# Patient Record
Sex: MALE | Race: WHITE | NOT HISPANIC OR LATINO | Employment: UNEMPLOYED | URBAN - METROPOLITAN AREA
[De-identification: names, ages, dates, MRNs, and addresses within clinical notes are randomized per-mention and may not be internally consistent; named-entity substitution may affect disease eponyms.]

---

## 2020-05-11 LAB — HIV 1&2 EXT: NORMAL

## 2020-12-05 ENCOUNTER — TRANSFERRED RECORDS (OUTPATIENT)
Dept: HEALTH INFORMATION MANAGEMENT | Facility: CLINIC | Age: 50
End: 2020-12-05

## 2020-12-06 ENCOUNTER — TRANSFERRED RECORDS (OUTPATIENT)
Dept: HEALTH INFORMATION MANAGEMENT | Facility: CLINIC | Age: 50
End: 2020-12-06

## 2020-12-08 ENCOUNTER — TRANSFERRED RECORDS (OUTPATIENT)
Dept: HEALTH INFORMATION MANAGEMENT | Facility: CLINIC | Age: 50
End: 2020-12-08

## 2021-01-23 ENCOUNTER — TRANSFERRED RECORDS (OUTPATIENT)
Dept: HEALTH INFORMATION MANAGEMENT | Facility: CLINIC | Age: 51
End: 2021-01-23

## 2021-02-02 LAB
ALT SERPL-CCNC: 16 U/L (ref 0–55)
AST SERPL-CCNC: 33 U/L (ref 10–40)
GLUCOSE SERPL-MCNC: 96 MG/DL (ref 70–100)
INR PPP: 1.4 (ref 0.9–1.1)

## 2021-03-01 ENCOUNTER — TRANSFERRED RECORDS (OUTPATIENT)
Dept: HEALTH INFORMATION MANAGEMENT | Facility: CLINIC | Age: 51
End: 2021-03-01

## 2021-03-02 ENCOUNTER — TRANSFERRED RECORDS (OUTPATIENT)
Dept: HEALTH INFORMATION MANAGEMENT | Facility: CLINIC | Age: 51
End: 2021-03-02

## 2021-03-10 ENCOUNTER — TRANSFERRED RECORDS (OUTPATIENT)
Dept: HEALTH INFORMATION MANAGEMENT | Facility: CLINIC | Age: 51
End: 2021-03-10

## 2021-03-10 LAB
CREAT SERPL-MCNC: 1.2 MG/DL (ref 0.73–1.18)
GFR SERPL CREATININE-BSD FRML MDRD: >60 ML/MIN/1.73M2
POTASSIUM SERPL-SCNC: 4 MMOL/L (ref 3.5–5.1)

## 2021-03-16 ENCOUNTER — MEDICAL CORRESPONDENCE (OUTPATIENT)
Dept: HEALTH INFORMATION MANAGEMENT | Facility: CLINIC | Age: 51
End: 2021-03-16

## 2021-03-24 ENCOUNTER — REFERRAL (OUTPATIENT)
Dept: TRANSPLANT | Facility: CLINIC | Age: 51
End: 2021-03-24

## 2021-03-24 DIAGNOSIS — Z12.5 ENCOUNTER FOR SCREENING FOR MALIGNANT NEOPLASM OF PROSTATE: ICD-10-CM

## 2021-03-24 DIAGNOSIS — K70.31 ALCOHOLIC CIRRHOSIS OF LIVER WITH ASCITES (H): Primary | ICD-10-CM

## 2021-03-24 DIAGNOSIS — Z79.899 OTHER LONG TERM (CURRENT) DRUG THERAPY: ICD-10-CM

## 2021-03-24 DIAGNOSIS — K76.6 PORTAL HYPERTENSION (H): ICD-10-CM

## 2021-03-24 DIAGNOSIS — N18.31 CHRONIC KIDNEY DISEASE, STAGE 3A (H): ICD-10-CM

## 2021-03-24 DIAGNOSIS — I82.890: ICD-10-CM

## 2021-03-24 DIAGNOSIS — K70.30 ALCOHOLIC CIRRHOSIS (H): ICD-10-CM

## 2021-03-24 NOTE — LETTER
4/6/2021    Colby Kerr  [address] [place] [address]      Dear Colby,    Thank you for your interest in the Transplant Center at [facility]. We look forward to being a part of your care team and assisting you through the transplant process.    As we discussed, your transplant coordinator is Alfonso Garcia Jr. (Liver).  You may call your coordinator at any time with questions or concerns call [phone].    Please complete the following.    1. Fill out and return the enclosed forms    Authorization for Electronic Communication    Authorization to Discuss Protected Health Information    Authorization for Release of Protected Health Information    Authorization for Care Everywhere Release of Information    2. Sign up for:    [employer], access to your electronic medical record (see enclosed pamphlet)    [email or web address], a transplant education website    You can use these tools to learn more about your transplant, communicate with your care team, and track your medical details      Sincerely,  Solid Organ Transplant  [facility]    cc: Referring Physician and PCP

## 2021-03-24 NOTE — LETTER
December 20, 2022    Colby Kerr  [address] [place] [address]      Dear Mr. Kerr,   The purpose of this letter is to let you know that upon reviewing your records, the transplant team has closed your Liver Transplant Referral.  The team recommended consult with neurology and it appears they have made attempts to follow up and were unable to get a hold of you.    Important things you should know:    If you would like to discuss the decision, or if your medical status changes you may schedule a return visits with your doctor by calling [phone] and asking to speak to your transplant coordinator.    We recommend that you continue to follow up with your primary care doctor in order to manage your health concerns.  We want you to know that our program has physician and surgeon coverage 24 hours a day, 365 days a year. In addition, our transplant surgeons and physicians will not be on call for two or more transplant programs more than 30 miles apart unless the circumstances have been reviewed and approved by the United Network for Organ Sharing (UNOS) Membership and Professional Standards Committee (MPSC). Finally, our primary physician and primary surgeons are not designated as the primary surgeon or primary physician at more than 1 transplant hospital. If this coverage changes or there are substantial program changes, you will be notified in writing by letter.   Attached is a letter from UNOS that describes the services and information offered to patients by UNOS and the Organ Procurement and Transplantation Network (OPTN).  Thank you for allowing us to participate in your care.  We wish you well.  Sincerely,    Solid Organ Transplant  [facility]      cc: Care Team            The Organ Procurement and Transplantation Network  Toll-free patient services line:     Your resource for organ transplant information    If you have a question  regarding your own medical care, you always should call your transplant hospital first. However, for general organ transplant-related information, you can call the Organ Procurement and Transplantation Network (OPTN) toll-free patient services line at [phone]. Anyone, including potential transplant candidates, candidates, recipients, family members, friends, living donors, and donor family members, can call this number to:          Talk about organ donation, living donation, the transplant process, the donation process, and transplant policies.    Get a free patient information kit with helpful booklets, waiting list and transplant information, and a list of all transplant hospitals.    Ask questions about the OPTN website (https://optn.transplant.[facility].gov/), the United Network for Organ Sharing s (UNOS) website (https://unos.org/), or the UNOS website for living donors and transplant recipients. (https://www.transplantliving.org/).    Learn how the OPTN can help you.    Talk about any concerns that you may have with a transplant hospital.    The [facility] transplant system, the OPTN, is managed under federal contract by the United Network for Organ Sharing (UNOS), which is a non-profit charitable organization. The OPTN helps create and define organ sharing policies that make the best use of donated organs. This process continuously evaluating new advances and discoveries so policies can be adapted to best serve patients waiting for transplants. To do so, the OPTN works closely with transplant professionals, transplant patients, transplant candidates, donor families, living donors, and the public. All transplant programs and organ procurement organizations throughout the country are OPTN members and are obligated to follow the policies the OPTN creates for allocating organs.                  The OPTN also is responsible for:      Providing educational material for patients, the public, and  professionals.    Raising awareness of the need for donated organs and tissue.    Coordinating organ procurement, matching, and placement.    Collecting information about every organ transplant and donation that occurs in the [place].    Remember, you should contact your transplant hospital directly if you have questions or concerns about your own medical care including medical records, work-up progress, and test results.    We are not your transplant hospital, and our staff will not be able to answer questions about your case, so please keep your transplant hospital s phone number handy.    However, while you research your transplant needs and learn as much as you can about transplantation and donation, we welcome your call to our toll-free patient services line at [phone].          Updated 4/1/2019

## 2021-04-05 VITALS — BODY MASS INDEX: 21.7 KG/M2 | WEIGHT: 155 LBS | HEIGHT: 71 IN

## 2021-04-05 PROBLEM — N28.9 ACUTE RENAL INSUFFICIENCY: Status: ACTIVE | Noted: 2020-12-15

## 2021-04-05 PROBLEM — K76.9 LIVER LESION: Status: ACTIVE | Noted: 2020-12-15

## 2021-04-05 SDOH — HEALTH STABILITY: MENTAL HEALTH: HOW OFTEN DO YOU HAVE 6 OR MORE DRINKS ON ONE OCCASION?: NOT ASKED

## 2021-04-05 SDOH — HEALTH STABILITY: MENTAL HEALTH: HOW OFTEN DO YOU HAVE A DRINK CONTAINING ALCOHOL?: NOT ASKED

## 2021-04-05 SDOH — HEALTH STABILITY: MENTAL HEALTH: HOW MANY STANDARD DRINKS CONTAINING ALCOHOL DO YOU HAVE ON A TYPICAL DAY?: NOT ASKED

## 2021-04-05 ASSESSMENT — MIFFLIN-ST. JEOR: SCORE: 1580.21

## 2021-04-05 NOTE — TELEPHONE ENCOUNTER
Patient was asked the following questions during liver intake call.     Referring Provider: Rema TITUS  Referring Diagnosis: Alcoholic Cirrhosis of the Liver   PCP: Dr. Maria R Brown     1)Do you know why you have liver disease: Yes             If Alcoholic Cirrhosis is present when was your last drink: 9/2020             Have you ever been through treatment for alcohol: No  2) Presence of Ascites: Yes Paracentesis: Yes  3) Presence of Hepatic Encephalopathy: Yes Medications: Yes  4) History of GI Bleeding: None  5) Oxygen Use: None  6) EGD: Yes Where: [facility] When: 2016  7) Colonoscopy: None   8) MELD Score: 29   9) Insurance information: [employer]       Policy pizano: Self       Subscriber/policy/ID number: [id number]       Group Number:     Referral intake process completed.  Patient is aware that after financial approval is received, medical records will be requested.   Patient confirmed for a callback from transplant coordinator on 4/7/21.  Tentative evaluation date TBD.    Confirmed coordinator will discuss evaluation process in more detail at the time of their call.   Patient is aware of the need to arrange age appropriate cancer screening, vaccinations, and dental care.  Reminded patient to complete questionnaire, complete medical records release, and review packet prior to evaluation visit .  Assessed patient for special needs (ie--wheelchair, assistance, guardian, and ):  None   Patient instructed to call [phone] with questions.     Patient gave verbal consent during intake call to obtain medical records and documents outside of [facility]:  Yes    MAGDALENA Jules, LPN   Solid Organ Transplant

## 2021-04-07 PROBLEM — K76.9 LIVER LESION: Status: RESOLVED | Noted: 2020-12-15 | Resolved: 2021-04-07

## 2021-04-07 PROBLEM — I82.890: Status: ACTIVE | Noted: 2021-04-07

## 2021-04-07 PROBLEM — N18.30 CKD (CHRONIC KIDNEY DISEASE) STAGE 3, GFR 30-59 ML/MIN (H): Status: ACTIVE | Noted: 2020-12-15

## 2021-04-12 NOTE — TELEPHONE ENCOUNTER
RECORDS RECEIVED FROM: Internal   Appt Date: 04.20.2021   NOTES STATUS DETAILS   OFFICE NOTE from referring provider Internal 03.24.2021 Svetlana Greenwood MD   OFFICE NOTES from other specialists Care Everywhere 03.16.2021 Rema Laws, APRN, CNP      03.12.2021 Arcenio Galvez MD      03.10.2021 Charity Lewis MD      12.15.2020 Maria R Brown MD     DISCHARGE SUMMARY from hospital Care Everywhere 01.23.2021 Mar Null MD    More in Epic   MEDICATION LIST Care Everywhere    LIVER BIOSPY (IF APPLICABLE)      PATHOLOGY REPORTS  N/A    IMAGING     ENDOSCOPY (IF AVAILABLE) Care Everywhere 01.19.2016   COLONOSCOPY (IF AVAILABLE) N/A    ULTRASOUND LIVER Care Everywhere 04.05.2021, 04.12.2021,  12.28.2020, ULTRASOUND GUIDANCE PARACENTESIS    12.08.2020 US Renal W Bladder   CT OF ABDOMEN N/A    MRI OF LIVER Internal 03.02.2021 MR Abd W/WO IV Cont      12.06.2020 MR ABDOMEN HEPATOBILIARY WITHOUT AND WITH IV CONTRAST   FIBROSCAN, US ELASTOGRAPHY, FIBROSIS SCAN, MR ELASTOGRAPHY N/A    LABS     HEPATIC PANEL (LIVER PANEL) Care Everywhere 02.02.2021   BASIC METABOLIC PANEL Care Everywhere 03.10.2021   COMPLETE METABOLIC PANEL Care Everywhere 03.10.2021   COMPLETE BLOOD COUNT (CBC) Care Everywhere 03.10.2021   INTERNATIONAL NORMALIZED RATIO (INR) Care Everywhere 02.02.2021   HEPATITIS C ANTIBODY N/A    HEPATITIS C VIRAL LOAD/PCR N/A    HEPATITIS C GENOTYPE N/A    HEPATITIS B SURFACE ANTIGEN N/A    HEPATITIS B SURFACE ANTIBODY N/A    HEPATITIS B DNA QUANT LEVEL N/A    HEPATITIS B CORE ANTIBODY N/A      Action 04.12.2021 RM   Action Taken Called [facility] to get images pushed over, called [phone] was told to fax request to [phone]. Pending     Action 04.13.2021 RM   Action Taken Images received and uploaded to chart.

## 2021-04-13 NOTE — TELEPHONE ENCOUNTER
RECORDS RECEIVED FROM: Internal   Appt Date: 04.20.2021   NOTES STATUS DETAILS   OFFICE NOTE from referring provider Internal 03.24.2021 Svetlana Greenwood MD   OFFICE NOTES from other specialists Care Everywhere 03.16.2021 Rema Laws, APRN, CNP       03.12.2021 Arcenio Galvez MD       03.10.2021 Charity Lewis MD       12.15.2020 Maria R Brown MD     DISCHARGE SUMMARY from hospital Care Everywhere 01.23.2021 Mar Null MD    More in Epic   MEDICATION LIST Care Everywhere     LIVER BIOSPY (IF APPLICABLE)        PATHOLOGY REPORTS  N/A     IMAGING       ENDOSCOPY (IF AVAILABLE) Care Everywhere 01.19.2016   COLONOSCOPY (IF AVAILABLE) N/A     ULTRASOUND LIVER Care Everywhere 04.05.2021, 04.12.2021,  12.28.2020, ULTRASOUND GUIDANCE PARACENTESIS     12.08.2020 US Renal W Bladder   CT OF ABDOMEN N/A     MRI OF LIVER Internal 03.02.2021 MR Abd W/WO IV Cont       12.06.2020 MR ABDOMEN HEPATOBILIARY WITHOUT AND WITH IV CONTRAST   FIBROSCAN, US ELASTOGRAPHY, FIBROSIS SCAN, MR ELASTOGRAPHY N/A     LABS       HEPATIC PANEL (LIVER PANEL) Care Everywhere 02.02.2021   BASIC METABOLIC PANEL Care Everywhere 03.10.2021   COMPLETE METABOLIC PANEL Care Everywhere 03.10.2021   COMPLETE BLOOD COUNT (CBC) Care Everywhere 03.10.2021   INTERNATIONAL NORMALIZED RATIO (INR) Care Everywhere 02.02.2021   HEPATITIS C ANTIBODY N/A     HEPATITIS C VIRAL LOAD/PCR N/A     HEPATITIS C GENOTYPE N/A     HEPATITIS B SURFACE ANTIGEN N/A     HEPATITIS B SURFACE ANTIBODY N/A     HEPATITIS B DNA QUANT LEVEL N/A     HEPATITIS B CORE ANTIBODY N/A        Action 04.12.2021 RM   Action Taken Called [facility] to get images pushed over, called [phone] was told to fax request to [phone]. Pending      Action 04.13.2021 RM   Action Taken Images received and uploaded to chart.

## 2021-04-19 NOTE — PROGRESS NOTES
[facility] Solid Organ Transplant  Outpatient MNT: Liver Transplant Evaluation    Current BMI: 20.6 (HT 70 in,  lbs/65 kg)  BMI is within recommendation of <40 for liver transplant    Frailty Assessment -- Frail (3/5)- unintentional weight loss, exhaustion, slowness         Time Spent: 30 minutes  Visit Type: Initial   Referring Physician: Sherry   Pt accompanied by: self     History of previous txp: none     Nutrition Assessment  Mom cooks some of the time. 2g Na. Overall going well    Appetite: varies based on para and ascites     Vitamins, Supplements, Pertinent Meds: folic acid, calcium   Herbal Medicines/Supplements: none     Edema: + ascites, no AUGUST     Weight hx: prev 150-155 lbs; down to 140s now; muscle wasting (upper body)- moderate    Food Security: any concerns about having enough money to buy food or access to grocery stores? No     Diet Recall  Breakfast Cereal; eggs (1-2) 2x/week    Lunch Leftovers    Dinner Homemade soup (veggie, potato, chickpeas, leeks, cabbage); open faced burger    Snacks Can of tuna 2x/week, can of chicken, cereal, fruit/granola/protein bars, Greek yogurt, trail mix     Beverages Protein powder + milk (0-2/day) x 3 months (30 g protein/drink), crystal light, herbal tea, occasional juice/pop, 8 oz milk/day    Dining out 2x/week      Physical Activity  Walks inside 2-3x/day, approx 40 min/day    Anthropometrics  Dosing wt: 143 lbs/65 kg- likely close to dry weight after para yesterday     Malnutrition   % Intake: No decreased intake noted  % Weight Loss: difficult to determine with fluid status   Subcutaneous Fat Loss: Moderate   Muscle Loss: Moderate/Severe   Fluid Accumulation/Edema: Moderate   Malnutrition Diagnosis: Non-Severe vs severe malnutrition in the context of chronic illness    Estimated Nutrition Needs   65-78-98 (1-1.2-1.5 g/kg) for repletion    Nutrition Diagnosis  Malnutrition r/t varying appetite 2/2 ascites, likely additional factors AEB  moderate/severe muscle wasting, frail status (3/5).     Nutrition Intervention  Nutrition education provided:  Discussed sodium intake (low sodium foods and drinks, seasoning food without salt and tips for low sodium diet).    Reviewed adequate protein intake. Encouraged receiving protein from both animal and plant based sources. Pt overall doing well focusing on increased protein intake. Reviewed protein at HS and why.     Reviewed post txp diet guidelines in brief (will review in further detail post txp):  (1) Review of proper food safety measures d/t immunosuppressant therapy post-op and increased risk for food-borne illness    (2) Avoid the following post txp d/t risk for rejection, unknown effects on the organs, and/or potential interactions with immunosuppressants:  - Herbal, Chinese, holistic, chiropractic, natural, alternative medicines and supplements  - Detoxes and cleanses  - Weight loss pills  - Protein powders or other products with extracts or herbs (ie green tea extract)    (3) Med regimen and possible side effects    Patient Understanding: Pt verbalized understanding of education provided.  Expected Engagement: Good  Follow-Up Plans: PRN     Nutrition Goals  Ideal minimum protein intake 65 g/day     Doris Chawla, RD, LD, CCTD

## 2021-04-20 ENCOUNTER — ALLIED HEALTH/NURSE VISIT (OUTPATIENT)
Dept: TRANSPLANT | Facility: CLINIC | Age: 51
End: 2021-04-20
Attending: INTERNAL MEDICINE
Payer: COMMERCIAL

## 2021-04-20 ENCOUNTER — ANCILLARY PROCEDURE (OUTPATIENT)
Dept: ULTRASOUND IMAGING | Facility: CLINIC | Age: 51
End: 2021-04-20
Attending: INTERNAL MEDICINE
Payer: COMMERCIAL

## 2021-04-20 ENCOUNTER — PRE VISIT (OUTPATIENT)
Dept: GASTROENTEROLOGY | Facility: CLINIC | Age: 51
End: 2021-04-20

## 2021-04-20 ENCOUNTER — OFFICE VISIT (OUTPATIENT)
Dept: TRANSPLANT | Facility: CLINIC | Age: 51
End: 2021-04-20
Attending: TRANSPLANT SURGERY
Payer: COMMERCIAL

## 2021-04-20 ENCOUNTER — COMMITTEE REVIEW (OUTPATIENT)
Dept: TRANSPLANT | Facility: CLINIC | Age: 51
End: 2021-04-20

## 2021-04-20 ENCOUNTER — OFFICE VISIT (OUTPATIENT)
Dept: GASTROENTEROLOGY | Facility: CLINIC | Age: 51
End: 2021-04-20
Attending: INTERNAL MEDICINE
Payer: COMMERCIAL

## 2021-04-20 VITALS
DIASTOLIC BLOOD PRESSURE: 70 MMHG | OXYGEN SATURATION: 98 % | WEIGHT: 143.6 LBS | SYSTOLIC BLOOD PRESSURE: 117 MMHG | HEIGHT: 70 IN | HEART RATE: 97 BPM | BODY MASS INDEX: 20.56 KG/M2

## 2021-04-20 DIAGNOSIS — K70.30 ALCOHOLIC CIRRHOSIS (H): ICD-10-CM

## 2021-04-20 DIAGNOSIS — R27.0 ATAXIA: ICD-10-CM

## 2021-04-20 DIAGNOSIS — I82.890: ICD-10-CM

## 2021-04-20 DIAGNOSIS — K76.6 PORTAL HYPERTENSION (H): ICD-10-CM

## 2021-04-20 DIAGNOSIS — Z12.5 ENCOUNTER FOR SCREENING FOR MALIGNANT NEOPLASM OF PROSTATE: ICD-10-CM

## 2021-04-20 DIAGNOSIS — K70.31 ALCOHOLIC CIRRHOSIS OF LIVER WITH ASCITES (H): ICD-10-CM

## 2021-04-20 DIAGNOSIS — Z79.899 OTHER LONG TERM (CURRENT) DRUG THERAPY: ICD-10-CM

## 2021-04-20 DIAGNOSIS — G31.9 CEREBELLAR ATROPHY (H): Primary | ICD-10-CM

## 2021-04-20 DIAGNOSIS — N18.31 CHRONIC KIDNEY DISEASE, STAGE 3A (H): ICD-10-CM

## 2021-04-20 LAB
ABO + RH BLD: NORMAL
ABO + RH BLD: NORMAL
ALBUMIN SERPL-MCNC: 3.2 G/DL (ref 3.4–5)
ALBUMIN UR-MCNC: NEGATIVE MG/DL
ALP SERPL-CCNC: 130 U/L (ref 40–150)
ALT SERPL W P-5'-P-CCNC: 26 U/L (ref 0–70)
APPEARANCE UR: ABNORMAL
AST SERPL W P-5'-P-CCNC: 38 U/L (ref 0–45)
BILIRUB DIRECT SERPL-MCNC: 0.6 MG/DL (ref 0–0.2)
BILIRUB SERPL-MCNC: 1.3 MG/DL (ref 0.2–1.3)
BILIRUB UR QL STRIP: NEGATIVE
BLD GP AB SCN SERPL QL: NORMAL
BLD GP AB SCN TITR SERPL: NORMAL {TITER}
BLOOD BANK CMNT PATIENT-IMP: NORMAL
CAOX CRY #/AREA URNS HPF: ABNORMAL /HPF
COLOR UR AUTO: ABNORMAL
CREAT UR-MCNC: 189 MG/DL
DEPRECATED CALCIDIOL+CALCIFEROL SERPL-MC: 20 UG/L (ref 20–75)
ERYTHROCYTE [DISTWIDTH] IN BLOOD BY AUTOMATED COUNT: 13.4 % (ref 10–15)
ETHYL GLUCURONIDE UR QL: NEGATIVE
GLUCOSE UR STRIP-MCNC: NEGATIVE MG/DL
HCT VFR BLD AUTO: 30.6 % (ref 40–53)
HGB BLD-MCNC: 10 G/DL (ref 13.3–17.7)
HGB UR QL STRIP: NEGATIVE
INR PPP: 1.46 (ref 0.86–1.14)
KETONES UR STRIP-MCNC: NEGATIVE MG/DL
LEUKOCYTE ESTERASE UR QL STRIP: NEGATIVE
MCH RBC QN AUTO: 30.2 PG (ref 26.5–33)
MCHC RBC AUTO-ENTMCNC: 32.7 G/DL (ref 31.5–36.5)
MCV RBC AUTO: 92 FL (ref 78–100)
MUCOUS THREADS #/AREA URNS LPF: PRESENT /LPF
NITRATE UR QL: NEGATIVE
PH UR STRIP: 5 PH (ref 5–7)
PLATELET # BLD AUTO: 67 10E9/L (ref 150–450)
PROT SERPL-MCNC: 7.1 G/DL (ref 6.8–8.8)
PROT UR-MCNC: 0.1 G/L
PROT/CREAT 24H UR: 0.05 G/G CR (ref 0–0.2)
PSA SERPL-ACNC: 0.29 UG/L (ref 0–4)
RBC # BLD AUTO: 3.31 10E12/L (ref 4.4–5.9)
RBC #/AREA URNS AUTO: 14 /HPF (ref 0–2)
SOURCE: ABNORMAL
SP GR UR STRIP: 1.03 (ref 1–1.03)
SPECIMEN EXP DATE BLD: NORMAL
UROBILINOGEN UR STRIP-MCNC: 0 MG/DL (ref 0–2)
WBC # BLD AUTO: 3.6 10E9/L (ref 4–11)
WBC #/AREA URNS AUTO: 3 /HPF (ref 0–5)

## 2021-04-20 PROCEDURE — 81001 URINALYSIS AUTO W/SCOPE: CPT | Performed by: PATHOLOGY

## 2021-04-20 PROCEDURE — 86886 COOMBS TEST INDIRECT TITER: CPT | Performed by: PATHOLOGY

## 2021-04-20 PROCEDURE — 85610 PROTHROMBIN TIME: CPT | Performed by: PATHOLOGY

## 2021-04-20 PROCEDURE — 99205 OFFICE O/P NEW HI 60 MIN: CPT | Performed by: INTERNAL MEDICINE

## 2021-04-20 PROCEDURE — 99205 OFFICE O/P NEW HI 60 MIN: CPT | Performed by: TRANSPLANT SURGERY

## 2021-04-20 PROCEDURE — 36415 COLL VENOUS BLD VENIPUNCTURE: CPT | Performed by: PATHOLOGY

## 2021-04-20 PROCEDURE — 82306 VITAMIN D 25 HYDROXY: CPT | Performed by: PATHOLOGY

## 2021-04-20 PROCEDURE — 80076 HEPATIC FUNCTION PANEL: CPT | Performed by: PATHOLOGY

## 2021-04-20 PROCEDURE — 86901 BLOOD TYPING SEROLOGIC RH(D): CPT | Performed by: PATHOLOGY

## 2021-04-20 PROCEDURE — 99207 PR NO CHARGE COORDINATED CARE PS: CPT

## 2021-04-20 PROCEDURE — 93975 VASCULAR STUDY: CPT | Mod: GC | Performed by: RADIOLOGY

## 2021-04-20 PROCEDURE — 86900 BLOOD TYPING SEROLOGIC ABO: CPT | Performed by: PATHOLOGY

## 2021-04-20 PROCEDURE — 80321 ALCOHOLS BIOMARKERS 1OR 2: CPT | Performed by: PATHOLOGY

## 2021-04-20 PROCEDURE — 80307 DRUG TEST PRSMV CHEM ANLYZR: CPT | Mod: 90 | Performed by: PATHOLOGY

## 2021-04-20 PROCEDURE — G0103 PSA SCREENING: HCPCS | Performed by: PATHOLOGY

## 2021-04-20 PROCEDURE — G0463 HOSPITAL OUTPT CLINIC VISIT: HCPCS

## 2021-04-20 PROCEDURE — 85027 COMPLETE CBC AUTOMATED: CPT | Performed by: PATHOLOGY

## 2021-04-20 PROCEDURE — 84156 ASSAY OF PROTEIN URINE: CPT | Performed by: PATHOLOGY

## 2021-04-20 PROCEDURE — 86850 RBC ANTIBODY SCREEN: CPT | Performed by: PATHOLOGY

## 2021-04-20 RX ORDER — FOLIC ACID 1 MG/1
1 TABLET ORAL
COMMUNITY
Start: 2020-12-09 | End: 2021-12-09

## 2021-04-20 RX ORDER — LEVETIRACETAM 500 MG/1
500 TABLET ORAL
COMMUNITY
Start: 2020-12-09

## 2021-04-20 RX ORDER — MIDODRINE HYDROCHLORIDE 5 MG/1
5 TABLET ORAL
COMMUNITY
Start: 2020-12-09 | End: 2021-12-09

## 2021-04-20 ASSESSMENT — MIFFLIN-ST. JEOR: SCORE: 1512.62

## 2021-04-20 NOTE — Clinical Note
Please schedule patient for Dr. Greenwood and Lubna follow up asap. Both return visits, in-person please. Labs before, echo after. Thanks, tk

## 2021-04-20 NOTE — PROGRESS NOTES
"Psychosocial Assessment for Liver Transplant Evaluation  Colby Kerr was seen in the Transplant Center as part of his evaluation as a potential liver transplant recipient.  He attended today's appointments alone.  Living Situation: Colby has not had a permanent residence for the past three years.  He lives between his mother Nevaeh's residence in [place], his cousin Aston's in [place], and his cousin Ginger's residence in [place].  Colby reports he is independent with his personal cares, ambulation and medication management.  He reports compliance with his medications, and \"rare\" misses of medications.  Colby has not drove a vehicle for the past eight years due to having a seizure disorder.  Education/Employment:  Colby graduated high school and attended eight colleges.  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  Financial /Income: Colby receives SSDI benefits. He has concerns about finding housing due to his low income status.  He verbalizes understanding how to apply for subsidized housing.  Health Insurance:  [employer] Medicare Advantage.  I did discuss that this policy is out of network for non-transplant related care, and that patient would be responsible for 20% of the cost of immunosuppression medications until he meets his out of pocket maximum.  I did provide him with [facility] contact information and encouraged him to explore alternative insurance options.   This writer talked with Colby about the financial risks of transplant, particularly about the high cost of transplant related medications and the importance of maintaining adequate health insurance coverage.  Family/Social Support: Colby reports he was never , and is not currently in a relationship.  He has one son Layo who is eighteen and lives with his mother in [place].      Colby reports his " strongest support come from his sister Ivon ([place]), mother Nevaeh ([place]), cousin Aston ([place]) and cousin Ginger ([place]).     This writer stressed the importance of having a stable and involved support network before and after transplant.  Provided Colby  with education about the relationship between a stable support system and better surgical and post-transplant outcomes compared to patients with a limited support system.    Functional Status: Colby has used a wheelchair for the past five years.  He is ambulatory.  Chemical Dependency:  Colby reports a history of smoking cigarettes, though he reports smoking a pack per months for many years.  He has since discontinued all use.  Colby denies any history of pain medication abuse.  He reports current marijuana use as recent as a few weeks ago.  Colby reports a history of using acid for a couple years, and discontinued all use at the age of [age].  Colby reports his last consumption of alcohol was early September 2020, and he had been consuming alcohol daily prior to discontinuing all consumption.  Colby reports he would consume a 1.75L of hard liquor every three to four days.  Colby reports previous attempts at sobriety, achieving six to eight months of sobriety a few times since 2013.  Colby's reports a history of two chemical dependency treatments, eight years ago at a location in [place] and four years ago at [facility] in [place], [place].  Colby reports he stayed sober for one day after leaving treatment.  Colby also has a history of two DUIs at the ages of 19 and 40.  Mental Health: Colby denies any mental health history.  He specifically denies any history of suicidal ideation or hospitalization for mental health treatment.  Adjustment to illness: Colby has had alcoholic liver disease since 2013, and he had struggled with his health care providers' recommendation for abstinence.    This writer provided Colby  with supportive counseling  throughout this interview.  This writer also encouraged Colby to attend the liver transplant support group for additional support and encouragement.   Impression/Recommendations:   Colby verbalizes understanding the psychosocial risks of transplant and teaching provided during this evaluation.  It will be important to engage patient's support system in future appointments.  Colby was encouraged to bring one of his care givers to follow up appointments.  Colby has had alcoholic liver disease since 2013 which is well documented in his medical record.  He has been advised to abstain from alcohol since 2013.  Colby has had multiple attempts at sobriety but has had multiple relapses.  Colby reports his last consumption of alcohol to be early September 2020.  I have recommended he complete a substance use assessment at [facility].  I have provided him with contact information to schedule a substance use assessment.  Neuropsychological testing is recommended given patient's diagnosis of cerebellar dystrophy.  Colby has limited finances and he does not have a permanent residence.  He lives between three households who are part of his support system.  Further assessment of his post transplant care giver plan is needed.  Colby's health insurance is adequate for transplant.   This writer will remain available to assist patient throughout the evaluation process.  It was a pleasure to evaluate this patient for liver transplant.   Teaching completed during assessment:  1.     Housing and relocation needs post transplant.  2.     Caregiver needs post transplant.  3.     Financial issues related to transplant.  4.     Risks of alcohol use post transplant.  5.     Common psychosocial stressors pre/post transplant.          6.     Liver Transplant support group availability.          7.     [facility] Directive-form and education provided             Psychosocial Risks of Transplant Reviewed:  1.     Increased stress  related to your emotional, family, social, employment, or   financial situation.  2.     Affect on work and/or disability benefits.  3.     Affect on future health and life insurance.  4.     Transplant outcome expectations may not be met.  5.     Mental Health risks: anxiety, depression, PTSD, guilt, grief and chronic fatigue.     KULDEEP Berg

## 2021-04-20 NOTE — PROGRESS NOTES
HPI      ROS      Physical Exam    Assessment and Plan:  1. liver transplant evaluation - patient is a fair candidate overall. Benefits and surgical risks of a liver transplantation were discussed.  2.  End stage liver disease due to Laënnec cirrhosis  chronic  3.  Neurological deficit diabetes recently evaluated    Surgical evaluation:  1. Portal Vein:Patent  2. Hepatic Artery: Open  3. TIPS: absent  4. Previous Abdominal Surgery: No  5. Hepatocellular Carcinoma: None  6. Ascites: Present - large amount  7. Costal Angle: wide  8. Portopulmonary Hypertension: absent  9. Hepatopulmonary Syndrome: absent  10. Cardiac Evaluation: needs stress echocardiogram  11. Nutritional Status: Moderate    12. Meets guidelines to receive Living Donor  Yes -  If he is deemed to be a transplant candidate, his meld score is low and would certainly qualify for a living donor.  He would be a candidate for expiratory liver elevation.  Computed MELD-Na score unavailable. Necessary lab results were not found in the last year.  Computed MELD score unavailable. Necessary lab results were not found in the last year.    Recommendations:   The patient has got very significant neurological deficit.  He is unable to even walk and uses a wheelchair.  He has had multiple traumas to the head.  He will need a full neurological evaluation and possibly robust physical therapy before he is deemed a transplant candidate      Patients overall evaluation will be discussed at the Liver Transplant selection committee meeting with a final recommendation on the patients suitability for transplant to be made at that time.    Consult Full  Details:  Colby Kerr was seen in consultation at the request of Dr. Greenwood and Dr. Rema Laws  for evaluation as a potential liver transplant recipient.    Reason for Visit:  Colby Kerr is a 50 year old year old male with Laennec's, who presents for liver transplant evaluation.    HPI:  Presenting complaint:  Abdominal distention    Patient has got significant alcohol use history.  He has decompensated in the form of ascites.  He is requiring multiple abdominal taps.  No variceal bleeding.  No history suggestive of encephalopathy or spontaneous bacterial peritonitis.  Neuropsychic marks are a younger age.  He has sustained multiple injuries to his head.  He has developed neurological deficit.  He needs a full neurological work-up.    Past Medical History:   Diagnosis Date     Alcohol use disorder, moderate, in early remission (H) 9/15/2015     CKD (chronic kidney disease) stage 3, GFR 30-59 ml/min 12/15/2020    Follow up with nephrology 12/2020     Coagulopathy (H) 9/8/2015     Liver lesion 12/15/2020    Found 12/2020- MRI due January 2021 and follow up with oncology.     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  No past surgical history on file.  No family history on file.  Allergies   Allergen Reactions     Penicillins Unknown     Pt unsure of reaction     Prior to Admission medications    Medication Sig Start Date End Date Taking? Authorizing Provider   levETIRAcetam (KEPPRA) 500 MG tablet Take 500 mg by mouth 12/9/20  Yes Reported, Patient   midodrine (PROAMATINE) 5 MG tablet Take 5 mg by mouth 12/9/20 12/9/21 Yes Reported, Patient   folic acid (FOLVITE) 1 MG tablet Take 1 mg by mouth 12/9/20 12/9/21  Reported, Patient       Previous Transplant Hx: No    Cardiovascular Hx:       h/o Cardiac Issues: No       Exercise Tolerance: shortness of breath with exertion.    Potential Donor(s): No    ROS:    REVIEW OF SYSTEMS (check box if normal)  [x]                GENERAL  [x]                  PULMONARY [x]                 GENITOURINARY  [x]                 CNS                 [x]                  CARDIAC  [x]                  ENDOCRINE  [x]                 EARS,NOSE,THROAT [x]                  GASTROINTESTINAL [x]                  NEUROLOGIC    [x]          "TARSAHTHAAL  [x]                   HEMATOLOGY    Examination:     Vitals:  /70   Pulse 97   Ht 1.778 m (5' 10\")   Wt 65.1 kg (143 lb 9.6 oz)   SpO2 98%   BMI 20.60 kg/m      GENERAL APPEARANCE: alert and no distress; he is barely able to walk 2 feet.  He has got significant intentional tremor.  EYES: PERRL  HENT: mouth without ulcers or lesions  NECK: supple, no adenopathy  RESP: lungs clear to auscultation - no rales, rhonchi or wheezes  CV: regular rhythm, normal rate, no rub   ABDOMEN:  soft, nontender, no HSM or masses and bowel sounds normal  MS: extremities normal- no gross deformities noted, no evidence of inflammation in joints, no muscle tenderness  SKIN: no rash  NEURO: Normal strength and tone, sensory exam grossly normal, mentation intact and speech normal  PSYCH: mentation appears normal. and affect normal/bright      Results:   Recent Results (from the past 168 hour(s))   Hepatic panel    Collection Time: 04/20/21  7:39 AM   Result Value Ref Range    Bilirubin Direct 0.6 (H) 0.0 - 0.2 mg/dL    Bilirubin Total 1.3 0.2 - 1.3 mg/dL    Albumin 3.2 (L) 3.4 - 5.0 g/dL    Protein Total 7.1 6.8 - 8.8 g/dL    Alkaline Phosphatase 130 40 - 150 U/L    ALT 26 0 - 70 U/L    AST 38 0 - 45 U/L   Prostate spec antigen screen    Collection Time: 04/20/21  7:39 AM   Result Value Ref Range    PSA 0.29 0 - 4 ug/L   INR    Collection Time: 04/20/21  7:39 AM   Result Value Ref Range    INR 1.46 (H) 0.86 - 1.14   CBC with platelets    Collection Time: 04/20/21  7:39 AM   Result Value Ref Range    WBC 3.6 (L) 4.0 - 11.0 10e9/L    RBC Count 3.31 (L) 4.4 - 5.9 10e12/L    Hemoglobin 10.0 (L) 13.3 - 17.7 g/dL    Hematocrit 30.6 (L) 40.0 - 53.0 %    MCV 92 78 - 100 fl    MCH 30.2 26.5 - 33.0 pg    MCHC 32.7 31.5 - 36.5 g/dL    RDW 13.4 10.0 - 15.0 %    Platelet Count 67 (L) 150 - 450 10e9/L   ABO/Rh type and screen    Collection Time: 04/20/21  7:39 AM   Result Value Ref Range    ABO B     RH(D) Pos     " Antibody Screen Neg     Test Valid Only At          [facility]    Specimen Expires 04/23/2021    Protein  random urine with Creat Ratio    Collection Time: 04/20/21  7:46 AM   Result Value Ref Range    Protein Random Urine 0.10 g/L    Protein Total Urine g/gr Creatinine 0.05 0 - 0.2 g/g Cr   UA reflex to Microscopic and Culture    Collection Time: 04/20/21  7:46 AM    Specimen: Midstream Urine   Result Value Ref Range    Color Urine Kaye     Appearance Urine Slightly Cloudy     Glucose Urine Negative NEG^Negative mg/dL    Bilirubin Urine Negative NEG^Negative    Ketones Urine Negative NEG^Negative mg/dL    Specific Gravity Urine 1.030 1.003 - 1.035    Blood Urine Negative NEG^Negative    pH Urine 5.0 5.0 - 7.0 pH    Protein Albumin Urine Negative NEG^Negative mg/dL    Urobilinogen mg/dL 0.0 0.0 - 2.0 mg/dL    Nitrite Urine Negative NEG^Negative    Leukocyte Esterase Urine Negative NEG^Negative    Source Midstream Urine     RBC Urine 14 (H) 0 - 2 /HPF    WBC Urine 3 0 - 5 /HPF    Mucous Urine Present (A) NEG^Negative /LPF    Calcium Oxalate Few (A) NEG^Negative /HPF   Creatinine urine calculation only    Collection Time: 04/20/21  7:46 AM   Result Value Ref Range    Creatinine Urine 189 mg/dL     I had a long discussion with the patient regarding liver transplantation which included but was not limited to  the following points:    1. Liver transplant selection committee process.  2. The federal rules for cadaveric waiting list, the size and blood type matching of the organ. The availability of living-related donor transplantation.  3. The types of donors: brain death donors, non-heart beating donors, partial liver grafts: splits and living donor grafts  4. Extended criteria  Donors (older age, steasosis) and the increased  risk of primary non-function using the extended criteria donors  5. The CDC high risk donors,  Risk of donor transmitted infections and donor  transmitted malignancy  6. The liver transplant operation and the associated risks and technical complications which can include intraoperative death, post operative death,  Primary non-function, bleeding requiring re-operations, arterial and biliary complications, bowel perforations, and intra abdominal abscess. Some of these complicaitons may require a second operation.  7. The postoperative course, the ICU stay and risk of postoperative complications which can include sepsis, MI, stroke, brain injury, pneumonia, pleural effusions, and renal dysfunction.  8. The current 1 year and 5 year graft and patient survivals.  9. The need for life long immunosuppressive therapy and the side effects of these medications, including the possibility of toxicity, opportunistic infections, risk of cancer including lymphoma, and the possibility of rejection even if the patient is taking the medication exactly as prescribed.  10. The need for compliance with medications and follow-up visits in the clinic and thereafter.  11. The patient and family understand these risks and wish to proceed to transplantation       Review of prior external note(s) from - Outside records from [facility]  60 minutes spent on the date of the encounter doing chart review, history and exam, documentation and further activities per the note      CC  MITCHELL GONGORA H    Copy to patient  JANAK LEO   [id number] [place] [address]

## 2021-04-20 NOTE — NURSING NOTE
Chief Complaint   Patient presents with     Consult     Alcohol induced cirrhosis       vitals taken at pre liver eval visit today see flowsheet.    Yolie Preciado, [facility]  4/20/2021 10:51 AM

## 2021-04-20 NOTE — LETTER
4/20/2021         RE: Colby Kerr  [id number] [place] [address]        Dear Colleague,    Thank you for referring your patient, Colby Kerr, to the [facility] [place]. Please see a copy of my visit note below.    [facility]    Hepatology New Patient Visit    CHIEF COMPLAINT/REASON FOR VISIT:  Alcoholic liver disease (cirrhosis).      CONSULTING HEALTHCARE PROFESSIONAL:  Rema Bustamante NP.        HISTORY OF PRESENT ILLNESS:  Mr. Kerr is a 51-year-old male with a history of alcohol abuse who progressed to decompensated cirrhosis over 5 years ago.  He also had a history of cerebellar atrophy.  Etiology not clear.  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      Mr. Kerr who had previously been evaluated and seen at [facility] by Rema Bustamante on upper endoscopy, had esophageal varices that needs to be updated.  He has also a liver lesion which was read on last MRI as not seen, but they were previously identified as LI-RADS 3 and 4.      Today, his main issue is the ascites as this is requiring weekly paracentesis.  He also has minimal edema.  Otherwise, denies any confusion, memory issues, lethargy or forgetfulness.  He never had a gastrointestinal bleed like melena, hematemesis or hematochezia.  His weight has gone down and according to him, he has lost muscle mass.  He also had jaundice at different times since his first time and this was almost 6 years ago, and then he appeared and reappeared multiple times, the last one almost a year ago.  He had, as far as alcoholism is concerned, 2 treatments which he did 21, but went back on alcohol both times.  He claims that his last  alcohol use was 2020.   Patient denies fevers, sweats, chills. Has not been tested nor vaccinated for COVID 19.    Medical hx Surgical hx   Past Medical History:   Diagnosis Date     Alcohol use disorder, moderate, in early remission (H) 09/15/2015     Ataxia      Cerebellar atrophy (H)      CKD (chronic kidney disease) stage 3, GFR 30-59 ml/min 12/15/2020    Follow up with nephrology 12/2020     Coagulopathy (H) 09/08/2015     Liver lesion 12/15/2020    Found 12/2020- MRI due January 2021 and follow up with oncology.     Thrombocytopenia (H) 09/08/2015     Thrombosis of umbilical vein 04/07/2021      Past Surgical History:   Procedure Laterality Date     Cholycystectomy            Medications  Prior to Admission medications    Medication Sig Start Date End Date Taking? Authorizing Provider   folic acid (FOLVITE) 1 MG tablet Take 1 mg by mouth 12/9/20 12/9/21 Yes Reported, Patient   levETIRAcetam (KEPPRA) 500 MG tablet Take 500 mg by mouth 12/9/20  Yes Reported, Patient   midodrine (PROAMATINE) 5 MG tablet Take 5 mg by mouth 12/9/20 12/9/21 Yes Reported, Patient       Allergies  Allergies   Allergen Reactions     Penicillins Unknown     Pt unsure of reaction       Family hx Social hx   Family History   Problem Relation Age of Onset     Dementia Mother       Social History     Tobacco Use     Smoking status: Never Smoker     Smokeless tobacco: Current User     Types: Chew   Substance Use Topics     Alcohol use: Not Currently     Comment: Quit 9/2020     Drug use: Not Currently          REVIEW OF SYSTEMS:  Denies any fatigue now, had no headache but claims that he had seizures multiple times.  Denies also any cough, shortness of breath or chest pain.  He has anemia and easy bruising.  He is not diabetic nor does he have any thyroid issues.  He has ataxia related with his cerebellar atrophy.  Otherwise, he has depression and neurologically he has ataxia, not quite sure about this punch drunken syndrome and he is  "wheelchair bound.  He has no eye or hearing issues.  Denies also any skin problems.  Otherwise, a comprehensive review of symptoms was noncontributory if not stated above.           Examination  /70   Pulse 97   Ht 1.778 m (5' 10\")   Wt 65.1 kg (143 lb 9.6 oz)   SpO2 98%   BMI 20.60 kg/m      Gen- well, NAD, A+Ox3, normal color  Eye- EOMI  ENT- MMM, normal oropharynx  Lym- no palpable lymphadenopathy  CVS- S1, S2 normal, no added sounds, RRR  RS- CTA  Abd- Distended and positive shifting dullness.  Extr- pulses good, no AUGUST  MS- hands normal- no clubbing  Neuro- A+Ox3, Ataxic and wheel chair bound.  Skin- no rash or jaundice  Psych- normal mood    Laboratory  Lab Results   Component Value Date    POTASSIUM 4.0 03/10/2021    CR 1.20 03/10/2021       Lab Results   Component Value Date    BILITOTAL 1.3 04/20/2021    ALT 26 04/20/2021    AST 38 04/20/2021    ALKPHOS 130 04/20/2021       Lab Results   Component Value Date    ALBUMIN 3.2 04/20/2021    PROTTOTAL 7.1 04/20/2021        Lab Results   Component Value Date    WBC 3.6 04/20/2021    HGB 10.0 04/20/2021    MCV 92 04/20/2021    PLT 67 04/20/2021       Lab Results   Component Value Date    INR 1.46 04/20/2021       Radiology    ASSESSMENT AND PLAN:  Decompensated alcoholic liver disease.        Mr. Kerr is a 50-year-old with longstanding alcohol abuse.  Over 5 years, according to him, was diagnosed with what appears to be alcoholic hepatitis as the patient presented with fluid retention and jaundice.  He had the jaundice multiple times since then, but he has only abstained from alcoholic beverages last 11/2020 and he has 2 failed treatments in the past, rule 21 it appears.  Please see the  notes.    His MELD score is not very high, as his creatinine is normal and bilirubin low.  His INR was also 1.4, so he is in the teens (13).  We addressed with him a couple of issues:     1. He has liver lesions which were read as not seen on his MRI " done on 03/02/2020.  Please note that it was seen previously and that time it was read as LI-RADS 3 and 4.  Looking back the imaging, which was showing that were seen in the liver on his MRI done on 12/06/2020 at which time, it was noted that he had 1 cm focus of arterial hyper-enhancement in segment 6, and this was read as LI-RADS 4.  He had a larger area of arterial enhancement throughout the posterior segment, right hepatic lobe without venous washout and this was read as LI-RADS 3.  These were not seen on his last MRI.      2. He also has problems with cerebellar atrophy and he has ataxia.  We thought etiology not very clear.  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       3. Otherwise, he will need also to follow the recommendations per our  and he will be seen here in 3 months.  In the meantime, he will follow with his local Gastroenterology team and he will have his upper endoscopy repeated.  For all his other medical issues, he will also follow up locally with his primary teams.        This was a 1-hour visit, of which more than 50% was spent in explaining to the patient what our plan of care was.  We will see other members of the transplant team including the surgeon.     Svetlana Greenwood MD  Hepatology  [facility]    .

## 2021-04-20 NOTE — Clinical Note
Good morning, please set up neurology & neuropsychology asap, along with other stuff attached on same trip(s) if possible. Thanks, tk

## 2021-04-20 NOTE — LETTER
4/20/2021         RE: Colby Kerr  [address] [place] [address]        Dear Colleague,    Thank you for referring your patient, Colby Kerr, to the [facility]ANS[facility]. Please see a copy of my visit note below.    HPI      ROS      Physical Exam    Assessment and Plan:  1. liver transplant evaluation - patient is a fair candidate overall. Benefits and surgical risks of a liver transplantation were discussed.  2.  End stage liver disease due to Laënnec cirrhosis  chronic  3.  Neurological deficit diabetes recently evaluated    Surgical evaluation:  1. Portal Vein:Patent  2. Hepatic Artery: Open  3. TIPS: absent  4. Previous Abdominal Surgery: No  5. Hepatocellular Carcinoma: None  6. Ascites: Present - large amount  7. Costal Angle: wide  8. Portopulmonary Hypertension: absent  9. Hepatopulmonary Syndrome: absent  10. Cardiac Evaluation: needs stress echocardiogram  11. Nutritional Status: Moderate    12. Meets guidelines to receive Living Donor  Yes -  If he is deemed to be a transplant candidate, his meld score is low and would certainly qualify for a living donor.  He would be a candidate for expiratory liver elevation.  Computed MELD-Na score unavailable. Necessary lab results were not found in the last year.  Computed MELD score unavailable. Necessary lab results were not found in the last year.    Recommendations:   The patient has got very significant neurological deficit.  He is unable to even walk and uses a wheelchair.  He has had multiple traumas to the head.  He will need a full neurological evaluation and possibly robust physical therapy before he is deemed a transplant candidate      Patients overall evaluation will be discussed at the Liver Transplant selection committee meeting with a final recommendation on the patients suitability for transplant to be made at that time.    Consult Full  Details:  Colby Kerr was seen in consultation at the request of   Timo and Dr. Rema Laws  for evaluation as a potential liver transplant recipient.    Reason for Visit:  Colby Kerr is a 50 year old year old male with Laennec's, who presents for liver transplant evaluation.    HPI:  Presenting complaint: Abdominal distention    Patient has got significant alcohol use history.  He has decompensated in the form of ascites.  He is requiring multiple abdominal taps.  No variceal bleeding.  No history suggestive of encephalopathy or spontaneous bacterial peritonitis.  Neuropsychic marks are a younger age.  He has sustained multiple injuries to his head.  He has developed neurological deficit.  He needs a full neurological work-up.    Past Medical History:   Diagnosis Date     Alcohol use disorder, moderate, in early remission (H) 9/15/2015     CKD (chronic kidney disease) stage 3, GFR 30-59 ml/min 12/15/2020    Follow up with nephrology 12/2020     Coagulopathy (H) 9/8/2015     Liver lesion 12/15/2020    Found 12/2020- MRI due January 2021 and follow up with oncology.     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  No past surgical history on file.  No family history on file.  Allergies   Allergen Reactions     Penicillins Unknown     Pt unsure of reaction     Prior to Admission medications    Medication Sig Start Date End Date Taking? Authorizing Provider   levETIRAcetam (KEPPRA) 500 MG tablet Take 500 mg by mouth 12/9/20  Yes Reported, Patient   midodrine (PROAMATINE) 5 MG tablet Take 5 mg by mouth 12/9/20 12/9/21 Yes Reported, Patient   folic acid (FOLVITE) 1 MG tablet Take 1 mg by mouth 12/9/20 12/9/21  Reported, Patient       Previous Transplant Hx: No    Cardiovascular Hx:       h/o Cardiac Issues: No       Exercise Tolerance: shortness of breath with exertion.    Potential Donor(s): No    ROS:    REVIEW OF SYSTEMS (check box if normal)  [x]                GENERAL  [x]                  PULMONARY [x]    "              GENITOURINARY  [x]                 CNS                 [x]                  CARDIAC  [x]                  ENDOCRINE  [x]                 EARS,NOSE,THROAT [x]                  GASTROINTESTINAL [x]                  NEUROLOGIC    [x]                 MUSCLOSKELTAL  [x]                   HEMATOLOGY    Examination:     Vitals:  /70   Pulse 97   Ht 1.778 m (5' 10\")   Wt 65.1 kg (143 lb 9.6 oz)   SpO2 98%   BMI 20.60 kg/m      GENERAL APPEARANCE: alert and no distress; he is barely able to walk 2 feet.  He has got significant intentional tremor.  EYES: PERRL  HENT: mouth without ulcers or lesions  NECK: supple, no adenopathy  RESP: lungs clear to auscultation - no rales, rhonchi or wheezes  CV: regular rhythm, normal rate, no rub   ABDOMEN:  soft, nontender, no HSM or masses and bowel sounds normal  MS: extremities normal- no gross deformities noted, no evidence of inflammation in joints, no muscle tenderness  SKIN: no rash  NEURO: Normal strength and tone, sensory exam grossly normal, mentation intact and speech normal  PSYCH: mentation appears normal. and affect normal/bright      Results:   Recent Results (from the past 168 hour(s))   Hepatic panel    Collection Time: 04/20/21  7:39 AM   Result Value Ref Range    Bilirubin Direct 0.6 (H) 0.0 - 0.2 mg/dL    Bilirubin Total 1.3 0.2 - 1.3 mg/dL    Albumin 3.2 (L) 3.4 - 5.0 g/dL    Protein Total 7.1 6.8 - 8.8 g/dL    Alkaline Phosphatase 130 40 - 150 U/L    ALT 26 0 - 70 U/L    AST 38 0 - 45 U/L   Prostate spec antigen screen    Collection Time: 04/20/21  7:39 AM   Result Value Ref Range    PSA 0.29 0 - 4 ug/L   INR    Collection Time: 04/20/21  7:39 AM   Result Value Ref Range    INR 1.46 (H) 0.86 - 1.14   CBC with platelets    Collection Time: 04/20/21  7:39 AM   Result Value Ref Range    WBC 3.6 (L) 4.0 - 11.0 10e9/L    RBC Count 3.31 (L) 4.4 - 5.9 10e12/L    Hemoglobin 10.0 (L) 13.3 - 17.7 g/dL    Hematocrit 30.6 (L) 40.0 - 53.0 %    MCV 92 78 - " 100 fl    MCH 30.2 26.5 - 33.0 pg    MCHC 32.7 31.5 - 36.5 g/dL    RDW 13.4 10.0 - 15.0 %    Platelet Count 67 (L) 150 - 450 10e9/L   ABO/Rh type and screen    Collection Time: 04/20/21  7:39 AM   Result Value Ref Range    ABO B     RH(D) Pos     Antibody Screen Neg     Test Valid Only At          [facility]    Specimen Expires 04/23/2021    Protein  random urine with Creat Ratio    Collection Time: 04/20/21  7:46 AM   Result Value Ref Range    Protein Random Urine 0.10 g/L    Protein Total Urine g/gr Creatinine 0.05 0 - 0.2 g/g Cr   UA reflex to Microscopic and Culture    Collection Time: 04/20/21  7:46 AM    Specimen: Midstream Urine   Result Value Ref Range    Color Urine Kaye     Appearance Urine Slightly Cloudy     Glucose Urine Negative NEG^Negative mg/dL    Bilirubin Urine Negative NEG^Negative    Ketones Urine Negative NEG^Negative mg/dL    Specific Gravity Urine 1.030 1.003 - 1.035    Blood Urine Negative NEG^Negative    pH Urine 5.0 5.0 - 7.0 pH    Protein Albumin Urine Negative NEG^Negative mg/dL    Urobilinogen mg/dL 0.0 0.0 - 2.0 mg/dL    Nitrite Urine Negative NEG^Negative    Leukocyte Esterase Urine Negative NEG^Negative    Source Midstream Urine     RBC Urine 14 (H) 0 - 2 /HPF    WBC Urine 3 0 - 5 /HPF    Mucous Urine Present (A) NEG^Negative /LPF    Calcium Oxalate Few (A) NEG^Negative /HPF   Creatinine urine calculation only    Collection Time: 04/20/21  7:46 AM   Result Value Ref Range    Creatinine Urine 189 mg/dL     I had a long discussion with the patient regarding liver transplantation which included but was not limited to  the following points:    1. Liver transplant selection committee process.  2. The federal rules for cadaveric waiting list, the size and blood type matching of the organ. The availability of living-related donor transplantation.  3. The types of donors: brain death donors, non-heart beating donors, partial liver grafts: splits  and living donor grafts  4. Extended criteria  Donors (older age, steasosis) and the increased  risk of primary non-function using the extended criteria donors  5. The CDC high risk donors,  Risk of donor transmitted infections and donor transmitted malignancy  6. The liver transplant operation and the associated risks and technical complications which can include intraoperative death, post operative death,  Primary non-function, bleeding requiring re-operations, arterial and biliary complications, bowel perforations, and intra abdominal abscess. Some of these complicaitons may require a second operation.  7. The postoperative course, the ICU stay and risk of postoperative complications which can include sepsis, MI, stroke, brain injury, pneumonia, pleural effusions, and renal dysfunction.  8. The current 1 year and 5 year graft and patient survivals.  9. The need for life long immunosuppressive therapy and the side effects of these medications, including the possibility of toxicity, opportunistic infections, risk of cancer including lymphoma, and the possibility of rejection even if the patient is taking the medication exactly as prescribed.  10. The need for compliance with medications and follow-up visits in the clinic and thereafter.  11. The patient and family understand these risks and wish to proceed to transplantation       Review of prior external note(s) from - Outside records from [facility]  60 minutes spent on the date of the encounter doing chart review, history and exam, documentation and further activities per the note    Again, thank you for allowing me to participate in the care of your patient.      Sincerely,      MD EDISON Blanton MOHAMED ABDIRAHMAN H    Copy to patient  JANAK LEO   [id number] [place] [id number]

## 2021-04-20 NOTE — LETTER
4/20/2021         RE: Colby Kerr  [address] [place] [address]      [facility]    Hepatology New Patient Visit    CHIEF COMPLAINT/REASON FOR VISIT:  Alcoholic liver disease (cirrhosis).      CONSULTING HEALTHCARE PROFESSIONAL:  Rema Bustamante NP.        HISTORY OF PRESENT ILLNESS:  Mr. Kerr is a 51-year-old male with a history of alcohol abuse who progressed to decompensated cirrhosis over 5 years ago.  He also had a history of cerebellar atrophy.  Etiology not clear.  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      Mr. Kerr who had previously been evaluated and seen at [facility] by Rema Bustamante on upper endoscopy, had esophageal varices that needs to be updated.  He has also a liver lesion which was read on last MRI as not seen, but they were previously identified as LI-RADS 3 and 4.      Today, his main issue is the ascites as this is requiring weekly paracentesis.  He also has minimal edema.  Otherwise, denies any confusion, memory issues, lethargy or forgetfulness.  He never had a gastrointestinal bleed like melena, hematemesis or hematochezia.  His weight has gone down and according to him, he has lost muscle mass.  He also had jaundice at different times since his first time and this was almost 6 years ago, and then he appeared and reappeared multiple times, the last one almost a year ago.  He had, as far as alcoholism is concerned, 2 treatments which he did 21, but went back on alcohol both times.  He claims that his last alcohol use was 2020.   Patient denies fevers, sweats, chills. Has not been tested nor vaccinated for COVID 19.    Medical hx Surgical hx   Past Medical History:   Diagnosis  Date     Alcohol use disorder, moderate, in early remission (H) 09/15/2015     Ataxia      Cerebellar atrophy (H)      CKD (chronic kidney disease) stage 3, GFR 30-59 ml/min 12/15/2020    Follow up with nephrology 12/2020     Coagulopathy (H) 09/08/2015     Liver lesion 12/15/2020    Found 12/2020- MRI due January 2021 and follow up with oncology.     Thrombocytopenia (H) 09/08/2015     Thrombosis of umbilical vein 04/07/2021      Past Surgical History:   Procedure Laterality Date     Cholycystectomy            Medications  Prior to Admission medications    Medication Sig Start Date End Date Taking? Authorizing Provider   folic acid (FOLVITE) 1 MG tablet Take 1 mg by mouth 12/9/20 12/9/21 Yes Reported, Patient   levETIRAcetam (KEPPRA) 500 MG tablet Take 500 mg by mouth 12/9/20  Yes Reported, Patient   midodrine (PROAMATINE) 5 MG tablet Take 5 mg by mouth 12/9/20 12/9/21 Yes Reported, Patient       Allergies  Allergies   Allergen Reactions     Penicillins Unknown     Pt unsure of reaction       Family hx Social hx   Family History   Problem Relation Age of Onset     Dementia Mother       Social History     Tobacco Use     Smoking status: Never Smoker     Smokeless tobacco: Current User     Types: Chew   Substance Use Topics     Alcohol use: Not Currently     Comment: Quit 9/2020     Drug use: Not Currently          REVIEW OF SYSTEMS:  Denies any fatigue now, had no headache but claims that he had seizures multiple times.  Denies also any cough, shortness of breath or chest pain.  He has anemia and easy bruising.  He is not diabetic nor does he have any thyroid issues.  He has ataxia related with his cerebellar atrophy.  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  He has no eye or hearing issues.  Denies also any skin problems.  Otherwise, a comprehensive review of symptoms was noncontributory if not stated above.  "          Examination  /70   Pulse 97   Ht 1.778 m (5' 10\")   Wt 65.1 kg (143 lb 9.6 oz)   SpO2 98%   BMI 20.60 kg/m      Gen- well, NAD, A+Ox3, normal color  Eye- EOMI  ENT- MMM, normal oropharynx  Lym- no palpable lymphadenopathy  CVS- S1, S2 normal, no added sounds, RRR  RS- CTA  Abd- Distended and positive shifting dullness.  Extr- pulses good, no AUGUST  MS- hands normal- no clubbing  Neuro- A+Ox3, Ataxic and wheel chair bound.  Skin- no rash or jaundice  Psych- normal mood    Laboratory  Lab Results   Component Value Date    POTASSIUM 4.0 03/10/2021    CR 1.20 03/10/2021       Lab Results   Component Value Date    BILITOTAL 1.3 04/20/2021    ALT 26 04/20/2021    AST 38 04/20/2021    ALKPHOS 130 04/20/2021       Lab Results   Component Value Date    ALBUMIN 3.2 04/20/2021    PROTTOTAL 7.1 04/20/2021        Lab Results   Component Value Date    WBC 3.6 04/20/2021    HGB 10.0 04/20/2021    MCV 92 04/20/2021    PLT 67 04/20/2021       Lab Results   Component Value Date    INR 1.46 04/20/2021       Radiology    ASSESSMENT AND PLAN:  Decompensated alcoholic liver disease.        Mr. Kerr is a 50-year-old with longstanding alcohol abuse.  Over 5 years, according to him, was diagnosed with what appears to be alcoholic hepatitis as the patient presented with fluid retention and jaundice.  He had the jaundice multiple times since then, but he has only abstained from alcoholic beverages last 11/2020 and he has 2 failed treatments in the past, rule 21 it appears.  Please see the  notes.    His MELD score is not very high, as his creatinine is normal and bilirubin low.  His INR was also 1.4, so he is in the teens (13).  We addressed with him a couple of issues:     1. He has liver lesions which were read as not seen on his MRI done on 03/02/2020.  Please note that it was seen previously and that time it was read as LI-RADS 3 and 4.  Looking back the imaging, which was showing that were seen in the " liver on his MRI done on 12/06/2020 at which time, it was noted that he had 1 cm focus of arterial hyper-enhancement in segment 6, and this was read as LI-RADS 4.  He had a larger area of arterial enhancement throughout the posterior segment, right hepatic lobe without venous washout and this was read as LI-RADS 3.  These were not seen on his last MRI.      2. He also has problems with cerebellar atrophy and he has ataxia.  We thought etiology not very clear.  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       3. Otherwise, he will need also to follow the recommendations per our  and he will be seen here in 3 months.  In the meantime, he will follow with his local Gastroenterology team and he will have his upper endoscopy repeated.  For all his other medical issues, he will also follow up locally with his primary teams.        This was a 1-hour visit, of which more than 50% was spent in explaining to the patient what our plan of care was.  We will see other members of the transplant team including the surgeon.     Svetlana Greenwood MD  Hepatology  [facility]  .

## 2021-04-20 NOTE — PROGRESS NOTES
[facility]    Hepatology New Patient Visit    CHIEF COMPLAINT/REASON FOR VISIT:  Alcoholic liver disease (cirrhosis).      CONSULTING HEALTHCARE PROFESSIONAL:  Rema Bustamante NP.        HISTORY OF PRESENT ILLNESS:  Mr. Kerr is a 51-year-old male with a history of alcohol abuse who progressed to decompensated cirrhosis over 5 years ago.  He also had a history of cerebellar atrophy.  Etiology not clear.  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      Mr. Kerr who had previously been evaluated and seen at [facility] by Rema Bustamante on upper endoscopy, had esophageal varices that needs to be updated.  He has also a liver lesion which was read on last MRI as not seen, but they were previously identified as LI-RADS 3 and 4.      Today, his main issue is the ascites as this is requiring weekly paracentesis.  He also has minimal edema.  Otherwise, denies any confusion, memory issues, lethargy or forgetfulness.  He never had a gastrointestinal bleed like melena, hematemesis or hematochezia.  His weight has gone down and according to him, he has lost muscle mass.  He also had jaundice at different times since his first time and this was almost 6 years ago, and then he appeared and reappeared multiple times, the last one almost a year ago.  He had, as far as alcoholism is concerned, 2 treatments which he did 21, but went back on alcohol both times.  He claims that his last alcohol use was 2020.   Patient denies fevers, sweats, chills. Has not been tested nor vaccinated for COVID 19.    Medical hx Surgical hx   Past Medical History:   Diagnosis Date     Alcohol use disorder, moderate, in early remission (H) 09/15/2015     Ataxia       "Cerebellar atrophy (H)      CKD (chronic kidney disease) stage 3, GFR 30-59 ml/min 12/15/2020    Follow up with nephrology 12/2020     Coagulopathy (H) 09/08/2015     Liver lesion 12/15/2020    Found 12/2020- MRI due January 2021 and follow up with oncology.     Thrombocytopenia (H) 09/08/2015     Thrombosis of umbilical vein 04/07/2021      Past Surgical History:   Procedure Laterality Date     Cholycystectomy            Medications  Prior to Admission medications    Medication Sig Start Date End Date Taking? Authorizing Provider   folic acid (FOLVITE) 1 MG tablet Take 1 mg by mouth 12/9/20 12/9/21 Yes Reported, Patient   levETIRAcetam (KEPPRA) 500 MG tablet Take 500 mg by mouth 12/9/20  Yes Reported, Patient   midodrine (PROAMATINE) 5 MG tablet Take 5 mg by mouth 12/9/20 12/9/21 Yes Reported, Patient       Allergies  Allergies   Allergen Reactions     Penicillins Unknown     Pt unsure of reaction       Family hx Social hx   Family History   Problem Relation Age of Onset     Dementia Mother       Social History     Tobacco Use     Smoking status: Never Smoker     Smokeless tobacco: Current User     Types: Chew   Substance Use Topics     Alcohol use: Not Currently     Comment: Quit 9/2020     Drug use: Not Currently          REVIEW OF SYSTEMS:  Denies any fatigue now, had no headache but claims that he had seizures multiple times.  Denies also any cough, shortness of breath or chest pain.  He has anemia and easy bruising.  He is not diabetic nor does he have any thyroid issues.  He has ataxia related with his cerebellar atrophy.  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  He has no eye or hearing issues.  Denies also any skin problems.  Otherwise, a comprehensive review of symptoms was noncontributory if not stated above.           Examination  /70   Pulse 97   Ht 1.778 m (5' 10\")   Wt 65.1 kg (143 lb 9.6 oz)  "  SpO2 98%   BMI 20.60 kg/m      Gen- well, NAD, A+Ox3, normal color  Eye- EOMI  ENT- MMM, normal oropharynx  Lym- no palpable lymphadenopathy  CVS- S1, S2 normal, no added sounds, RRR  RS- CTA  Abd- Distended and positive shifting dullness.  Extr- pulses good, no AUGUST  MS- hands normal- no clubbing  Neuro- A+Ox3, Ataxic and wheel chair bound.  Skin- no rash or jaundice  Psych- normal mood    Laboratory  Lab Results   Component Value Date    POTASSIUM 4.0 03/10/2021    CR 1.20 03/10/2021       Lab Results   Component Value Date    BILITOTAL 1.3 04/20/2021    ALT 26 04/20/2021    AST 38 04/20/2021    ALKPHOS 130 04/20/2021       Lab Results   Component Value Date    ALBUMIN 3.2 04/20/2021    PROTTOTAL 7.1 04/20/2021        Lab Results   Component Value Date    WBC 3.6 04/20/2021    HGB 10.0 04/20/2021    MCV 92 04/20/2021    PLT 67 04/20/2021       Lab Results   Component Value Date    INR 1.46 04/20/2021       Radiology    ASSESSMENT AND PLAN:  Decompensated alcoholic liver disease.        Mr. Kerr is a 50-year-old with longstanding alcohol abuse.  Over 5 years, according to him, was diagnosed with what appears to be alcoholic hepatitis as the patient presented with fluid retention and jaundice.  He had the jaundice multiple times since then, but he has only abstained from alcoholic beverages last 11/2020 and he has 2 failed treatments in the past, rule 21 it appears.  Please see the  notes.    His MELD score is not very high, as his creatinine is normal and bilirubin low.  His INR was also 1.4, so he is in the teens (13).  We addressed with him a couple of issues:     1. He has liver lesions which were read as not seen on his MRI done on 03/02/2020.  Please note that it was seen previously and that time it was read as LI-RADS 3 and 4.  Looking back the imaging, which was showing that were seen in the liver on his MRI done on 12/06/2020 at which time, it was noted that he had 1 cm focus of arterial  hyper-enhancement in segment 6, and this was read as LI-RADS 4.  He had a larger area of arterial enhancement throughout the posterior segment, right hepatic lobe without venous washout and this was read as LI-RADS 3.  These were not seen on his last MRI.      2. He also has problems with cerebellar atrophy and he has ataxia.  We thought etiology not very clear.  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       3. Otherwise, he will need also to follow the recommendations per our  and he will be seen here in 3 months.  In the meantime, he will follow with his local Gastroenterology team and he will have his upper endoscopy repeated.  For all his other medical issues, he will also follow up locally with his primary teams.        This was a 1-hour visit, of which more than 50% was spent in explaining to the patient what our plan of care was.  We will see other members of the transplant team including the surgeon.     Svetlana Greenwood MD  Hepatology  [facility]    .

## 2021-04-20 NOTE — COMMITTEE REVIEW
Abdominal Committee Review Note     Evaluation Date:   Committee Review Date: 4/20/2021    Organ being evaluated for: Liver    Transplant Phase: Referral  Transplant Status: Active    Transplant Coordinator: Alfonso Garcia Jr.  Transplant Surgeon:   John Powell    Referring Physician: Rema Laws    Primary Diagnosis: Alcoholic Cirrhosis  Secondary Diagnosis:     Committee Review Members:  Hepatology Ermelinda Allison MD   Nurse Practitioner Ivon Syed, NP   Nutrition Doris Chawla,    Pharmacy Chhaya Melendez, [facility]    - Clinical Lubna Martinez, LUCIAN, Lindsay Dwyer, [facility]   Transplant Daysi Caldwell, SOLITARIO, Karen White, RN, Twin Lopez MD, Marian Melton, SOLITARIO, Daniele Blake MD, Jr Alfonso Garcia RN, Roya Guillen MD, Batool Hopper, APRN CNP, Leslie Jimenez, RN, Svetlana Greenwood MD, Shemar Larios MD, John Powell MD, Nita Pack MD       Transplant Eligibility: Cirrhosis with MELD, ETOH    Committee Review Decision: Needs Re-presentation    Relative Contraindications: Other, pending full eval starting with neurology    Absolute Contraindications:     Committee Chair Wilmer, Roya Scales MD verbally attested to the committee's decision.    Committee Discussion Details:     Needs full evaluation  - starting with neurology at [facility]

## 2021-04-20 NOTE — Clinical Note
Patient still needing neurology and neuropsych scheduled to kick off his txp evaluation. Please schedule for asap. Thanks, tk

## 2021-04-23 LAB — PETH BLD-MCNC: NEGATIVE NG/ML

## 2021-06-01 ENCOUNTER — RECORDS - HEALTHEAST (OUTPATIENT)
Dept: ADMINISTRATIVE | Facility: CLINIC | Age: 51
End: 2021-06-01

## 2021-06-14 ENCOUNTER — TELEPHONE (OUTPATIENT)
Dept: TRANSPLANT | Facility: CLINIC | Age: 51
End: 2021-06-14

## 2021-06-14 NOTE — TELEPHONE ENCOUNTER
Transplant Social Work Services Phone Call      Data: Colby is being evaluated for liver transplantation.  I met with him and completed a psychosocial evaluation on 4/20/21.  He was advised to complete a substance use assessment at [facility].  I have not received an assessment for Colby.  Intervention: I attempted to reach Colby but was unable.  I left him a voice message and requested a call back.  Assessment: Further assessment is needed of patient's progress with follow recommendations for his chemical health. Further assessment of patient's living situation and care giving support is needed.  Education provided by SW: deferred  Plan: I am awaiting a return call from Colby.        LUCIAN Berg, [facility]  Liver Transplant   Phone [phone]  Pager [phone]

## 2021-07-26 ENCOUNTER — TELEPHONE (OUTPATIENT)
Dept: GASTROENTEROLOGY | Facility: CLINIC | Age: 51
End: 2021-07-26

## 2021-07-26 NOTE — TELEPHONE ENCOUNTER
3rd attempt letter sent.      Paty Springer Procedure   Neurology & Neurosurgery Specialties  [facility]   [phone]

## 2022-12-20 NOTE — TELEPHONE ENCOUNTER
Liver Transplant Referral Status  December 20, 2022    Patient's case reviewed via chart review.  Plan was made in regards to the status of the patient's Liver Transplant Referral:    Closed due to lost to follow up with care at [facility] (recommended neuro consult, multiple attempts made by team to contact patient).  It appears that patient may actively be using ETOH, per family report on last ER admission.  Although, this has not been confirmed.    Patient notified via letter, confirming the status of the Liver Transplant Referral.  Patient encouraged to contact the Liver Transplant Team with any further questions and/or concerns related to the status of his/her evaluation.

## 2025-03-20 NOTE — TELEPHONE ENCOUNTER
"51 y/o referral from Rema Laws NP at  GI for alcoholic cirrhosis and CKD 3.    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 Patient having movement disorder, speech is affected, and uses a wheel chair to get around. He stated he can be mobile and does try to work muscles as much as possible.     Found out about liver disease 7 yrs ago. Last drink he reports early Sept 2020.   He has done 28 days treatment program. Did another program after that.  Reports - \"nothing makes me want to drink more than attending rehab\"    Ascites - yes  Taps - weekly  HE - no and not on meds  GIB -   EGD - 1/19/2016 in [place]  Colonoscopy - never and needed    Head - cerebellar dystrophy  Heart - no  Lungs - occasional now; used to smoke ppd for 5 years & quick 2-3 yrs ago  Kidneys - CKD 3 seen nephrology at   Cancer - had liver lesion 3 months ago, but repeat showed no lesion    Plan: Tuesday 4/20 with Dr. Greenwood, who is not tanya GUPTA that week, but needs other appts that day as well. Slot held for Dr. Greenwood at 10 am.     He has been seen by Nephrology. He has CHD 3. Has continued getting paracentesis for the ascites. He is getting these completed weekly. His last one was yesterday. He had 5 liters removed. He does note not being compliant with the sodium restriction. He had pizza.     He is currently staying with his cousin who lives closer to [place]. He is requesting getting everything completed now so that he does not have transportation issues.     He would like to have a conversation about liver transplant, so he knows what his options in the future would be. He has been sober from alcohol since around November 2020. He has had chemical dependency treatment in the past. He is not opposed to attending AA meetings. He did not like the philosophy there. He states that \"alcoholism is not a disease, it is a choice.\" He believes that he can start and " Appointment scheduled for tomorrow at 9:15am   stop as he pleases without any issues.     He was following with Oncology for liver lesions, that have now resolved. They did not show up on the most recent MRI.    He has had a colonoscopy in the past, but he is ok to schedule EGD. This is overdue.    Today, he denies any fever or chills. No swelling in his ankles. His abdomen is not uncomfortable. No issues with appetite. Bowel movements he notes are normal, no blood, no strange colors. Denies taking any new medications.     No acute distress noted during this visit. Appears older than stated age. Has a history of cerebral atrophy, so at times hard for him to gather his words.    1. Alcoholic Cirrhosis with ascites. Continue to get paracentesis as scheduled. An EGD is ordered for variceal screening. An US of his abdomen is due again in 6 months. A DEXA scan is ordered for bone loss screening. [facility] per patient's request to discuss if he would ever be a transplant candidate. I do have concerns about relapses in alcohol consumption as well as being lost to follow-up for several years.   Highly encouraged abstinence and at the very least attend AA meetings. Get a sponsor. No changes are made to his current medications list. Patient is well aware of his disease and monitoring for any changes in symptoms. Patient was a nurse at one time.Follow a low sodium diet. Avoid alcohol. Continue to eat a well balanced diet with meeting protein requirements of 1.2-1.5 grams per kg of protein per day. Patient will contact my office with any new symptoms or concerns.  Follow-up with me in 3 months.